# Patient Record
Sex: MALE | Race: WHITE | NOT HISPANIC OR LATINO | URBAN - NONMETROPOLITAN AREA
[De-identification: names, ages, dates, MRNs, and addresses within clinical notes are randomized per-mention and may not be internally consistent; named-entity substitution may affect disease eponyms.]

---

## 2018-01-31 NOTE — PATIENT DISCUSSION
Cataract(s) are not yet visually significant to the patient. Patient would like to think about his options for now.

## 2018-01-31 NOTE — PATIENT DISCUSSION
Patient educated on Symfony blended vision. Patient understands that the near focal point will be at approximately 20 inches with the first eye. Near vision will be achieved with surgery on the second eye. Patient educated they may experience halos at night that are typically resolved without therapy. Patient educated there is a 1 in 500 chance there will be something about the vision that they do not like. Patient educated there is a 10% chance of needing enhancement after surgery. Patient would like to think about Symfony OD, goal of emmetropia for now.

## 2021-04-28 ENCOUNTER — IMPORTED ENCOUNTER (OUTPATIENT)
Dept: URBAN - NONMETROPOLITAN AREA CLINIC 1 | Facility: CLINIC | Age: 81
End: 2021-04-28

## 2021-04-28 PROBLEM — H25.813: Noted: 2021-04-28

## 2021-04-28 PROCEDURE — 92004 COMPRE OPH EXAM NEW PT 1/>: CPT

## 2021-04-28 NOTE — PATIENT DISCUSSION
Cataract(s)-Visually significant cataract OU .-Cataract(s) causing symptomatic impairment of visual function not correctable with a tolerable change in glasses or contact lenses lighting or non-operative means resulting in specific activity limitations and/or participation restrictions including but not limited to reading viewing television driving or meeting vocational or recreational needs. -Expectation is clearer vision and functional improvement in symptoms as well as reduced glare disability after cataract removal.-Order IOLMaster and OPD today. -Recommend Toric IOL   /   Limbal Relaxing Incisions based on today's OPD testing and lifestyle questionnaire.-All questions were answered regarding surgery including pre and post-op medications appointments activity restrictions and anesthetic usage.-The risks benefits and alternatives and special risk factors for the patient were discussed in detail including but not limited to: bleeding infection retinal detachment vitreous loss problems with the implant and possible need for additional surgery.-Although rare the possibility of complete vision loss was discussed.-The possible need for glasses post-operatively was discussed.-Order medical clearance exam based on history of diabetes-Patient elects to proceed with cataract surgery OD . Will schedule at patient's convenience and re-evaluate OS  in the future. Post op inflammation anticipated discussed Dextenza insertion after surgery. Discussed all lens pt qualifies for Toric. Explained lens w/ less dependence on glasses for distance but the need for reading glasses was gone over. Pt elects LenSX OU.

## 2021-05-10 ENCOUNTER — IMPORTED ENCOUNTER (OUTPATIENT)
Dept: URBAN - NONMETROPOLITAN AREA CLINIC 1 | Facility: CLINIC | Age: 81
End: 2021-05-10

## 2021-05-10 PROBLEM — H25.813: Noted: 2021-05-10

## 2021-05-11 ENCOUNTER — IMPORTED ENCOUNTER (OUTPATIENT)
Dept: URBAN - NONMETROPOLITAN AREA CLINIC 1 | Facility: CLINIC | Age: 81
End: 2021-05-11

## 2021-05-11 PROBLEM — I10: Noted: 2021-05-11

## 2021-05-11 PROBLEM — M19.90: Noted: 2021-05-11

## 2021-05-11 PROBLEM — E11.9: Noted: 2021-05-11

## 2021-06-03 ENCOUNTER — IMPORTED ENCOUNTER (OUTPATIENT)
Dept: URBAN - NONMETROPOLITAN AREA CLINIC 1 | Facility: CLINIC | Age: 81
End: 2021-06-03

## 2021-06-03 PROCEDURE — 99024 POSTOP FOLLOW-UP VISIT: CPT

## 2021-06-03 PROCEDURE — 92136 OPHTHALMIC BIOMETRY: CPT

## 2021-06-03 PROCEDURE — V2787 ASTIGMATISM-CORRECT FUNCTION: HCPCS

## 2021-06-03 PROCEDURE — 66984 XCAPSL CTRC RMVL W/O ECP: CPT

## 2021-06-03 PROCEDURE — 0356T INSERTION OF DRUG-ELUTING IMPLANT (INCLUDING PUNCTAL DILATION AND IMPLANT REMOVAL WHEN PERFORMED) INTO LACRIMAL CANALICULUS, EACH: CPT

## 2021-06-03 NOTE — PATIENT DISCUSSION
Same day s/p PCIOL OD Toric/LenSx with Dextenza (06/03/21)-Pt doing well s/p PCIOL. -Continue post-op gtts according to instruction sheet and sleep with eye shield over eye for 7 nights.-Avoid bending at the waist lifting anything over 5lbs and dirty or abisai environments. Cataract OS- Recommend proceed with cataract surgery as previously scheduled and discussed.

## 2021-06-04 PROBLEM — E11.9: Noted: 2021-06-04

## 2021-06-04 PROBLEM — Z01.818: Noted: 2021-06-04

## 2021-06-04 PROBLEM — M19.90: Noted: 2021-06-04

## 2021-06-04 PROBLEM — I10: Noted: 2021-06-04

## 2021-06-04 PROBLEM — H25.812: Noted: 2021-06-04

## 2021-06-04 PROBLEM — Z98.41: Noted: 2021-06-04

## 2021-06-18 ENCOUNTER — IMPORTED ENCOUNTER (OUTPATIENT)
Dept: URBAN - NONMETROPOLITAN AREA CLINIC 1 | Facility: CLINIC | Age: 81
End: 2021-06-18

## 2021-06-18 PROBLEM — Z98.41: Noted: 2021-06-18

## 2021-06-18 PROBLEM — Z98.42: Noted: 2021-06-18

## 2021-06-18 PROCEDURE — V2787 ASTIGMATISM-CORRECT FUNCTION: HCPCS

## 2021-06-18 PROCEDURE — 92136 OPHTHALMIC BIOMETRY: CPT

## 2021-06-18 PROCEDURE — 66984 XCAPSL CTRC RMVL W/O ECP: CPT

## 2021-06-18 PROCEDURE — 99024 POSTOP FOLLOW-UP VISIT: CPT

## 2021-06-18 PROCEDURE — 0356T INSERTION OF DRUG-ELUTING IMPLANT (INCLUDING PUNCTAL DILATION AND IMPLANT REMOVAL WHEN PERFORMED) INTO LACRIMAL CANALICULUS, EACH: CPT

## 2021-06-18 NOTE — PATIENT DISCUSSION
s/p PCIOL Same Day POV CE OS Toric/LenSX + Dextenza -Pt doing well s/p PCIOL. -Continue post-op gtts according to instruction sheet and sleep with eye shield over eye for 7 nights.-Avoid bending at the waist lifting anything over 5lbs and dirty or abisai environments. -RTC .s/p PCIOL CE OD Toric/LenSx + Dextenza -Pt doing well at 2 week s/p PCIOL. -Continue post-op gtts according to instruction sheet.-Okay to resume usual activites and d/c eye shield.

## 2022-04-09 ASSESSMENT — TONOMETRY
OD_IOP_MMHG: 16
OS_IOP_MMHG: 19
OD_IOP_MMHG: 16
OS_IOP_MMHG: 16
OD_IOP_MMHG: 19
OS_IOP_MMHG: 29

## 2022-04-09 ASSESSMENT — VISUAL ACUITY
OS_CC: 20/60
OD_GLARE: 20/60-2
OS_AM: 20/20
OD_GLARE: 20/60-2
OD_CC: 20/40+
OS_AM: 20/20
OS_CC: 20/30-2
OD_CC: 20/50-2
OS_CC: 20/25-
OS_SC: 20/30
OS_GLARE: 20/40
OS_CC: 20/30-2
OS_GLARE: 20/40
OD_PH: 20/50-2
OD_PH: 20/50
OD_CC: 20/80
OS_AM: 20/20
OD_PAM: 20/20-
OS_GLARE: 20/40
OS_CC: 20/25-
OS_CC: 20/30-2
OD_PH: 20/20-
OD_CC: 20/50-2
OD_CC: 20/80
OD_PH: 20/50-2
OS_SC: 20/30
OD_PAM: 20/20-
OD_CC: 20/20-2